# Patient Record
Sex: FEMALE | Race: ASIAN | NOT HISPANIC OR LATINO
[De-identification: names, ages, dates, MRNs, and addresses within clinical notes are randomized per-mention and may not be internally consistent; named-entity substitution may affect disease eponyms.]

---

## 2020-02-28 ENCOUNTER — RESULT REVIEW (OUTPATIENT)
Age: 25
End: 2020-02-28

## 2021-03-15 ENCOUNTER — RESULT REVIEW (OUTPATIENT)
Age: 26
End: 2021-03-15

## 2023-01-04 ENCOUNTER — APPOINTMENT (OUTPATIENT)
Dept: OPHTHALMOLOGY | Facility: CLINIC | Age: 28
End: 2023-01-04

## 2023-10-17 ENCOUNTER — NON-APPOINTMENT (OUTPATIENT)
Age: 28
End: 2023-10-17

## 2023-10-17 ENCOUNTER — APPOINTMENT (OUTPATIENT)
Dept: DERMATOLOGY | Facility: CLINIC | Age: 28
End: 2023-10-17
Payer: COMMERCIAL

## 2023-10-17 VITALS — BODY MASS INDEX: 21.6 KG/M2 | HEIGHT: 60 IN | WEIGHT: 110 LBS

## 2023-10-17 DIAGNOSIS — D22.9 MELANOCYTIC NEVI, UNSPECIFIED: ICD-10-CM

## 2023-10-17 PROCEDURE — 99203 OFFICE O/P NEW LOW 30 MIN: CPT

## 2024-05-03 ENCOUNTER — APPOINTMENT (OUTPATIENT)
Dept: DERMATOLOGY | Facility: CLINIC | Age: 29
End: 2024-05-03
Payer: COMMERCIAL

## 2024-05-03 DIAGNOSIS — L73.2 HIDRADENITIS SUPPURATIVA: ICD-10-CM

## 2024-05-03 DIAGNOSIS — L72.3 SEBACEOUS CYST: ICD-10-CM

## 2024-05-03 PROCEDURE — 99214 OFFICE O/P EST MOD 30 MIN: CPT | Mod: 25

## 2024-05-03 PROCEDURE — 11900 INJECT SKIN LESIONS </W 7: CPT

## 2024-05-03 RX ORDER — DOXYCYCLINE HYCLATE 100 MG/1
100 TABLET ORAL TWICE DAILY
Qty: 60 | Refills: 2 | Status: ACTIVE | COMMUNITY
Start: 2024-05-03 | End: 1900-01-01

## 2024-05-03 RX ORDER — CLINDAMYCIN PHOSPHATE 10 MG/ML
1 LOTION TOPICAL
Qty: 1 | Refills: 5 | Status: ACTIVE | COMMUNITY
Start: 2024-05-03 | End: 1900-01-01

## 2024-05-03 NOTE — HISTORY OF PRESENT ILLNESS
[FreeTextEntry1] : skin lesion [de-identified] : 29yo F presents for follow up, last seen 10/17/23 here for new skin concern on the right inguinal fold, present for several years but recently flaring took doxycycline before  denies involvement elsewhere, denies family hx of similar skin issue SH: current IM resident at [facility]

## 2024-05-03 NOTE — PHYSICAL EXAM
[Alert] : alert [Oriented x 3] : ~L oriented x 3 [Declined] : declined [FreeTextEntry3] : Focused exam: -hyperpigmented nodules and scarring on the bl inguinal folds, R > L

## 2024-05-03 NOTE — ASSESSMENT
[FreeTextEntry1] : #Hidradenitis suppurativa - Mark stage 1 -chronic, flaring -I have discussed the chronic nature and course of this condition -start hibiclens wash daily -start clindamycin 1% lotion bid to affected areas prn flare -start doxycycline 100mg BID x 1 month, SED- take with food/water, do not lie down for 30 mins after taking, use photoprotection, Cannot use in pregnancy or breast feeding.  #Inflamed EIC -ILK today as below  Procedure: Intralesional Kenalog (triamcinolone) Injection  Solution: 10 mg/mL Kenalog Total volume injected: 0.2 mL Location: R inguinal fold Lot: [id number] Exp date: 10/2025 Number of injection sites: 1   The risks/benefits/alternatives of intralesional steroid injections were reviewed with the patient which include but are not limited to pain, atrophy, and dyspigmentation The treatment location was cleansed with alcohol antiseptic and allowed to dry. The area was injected with intralesional kenalog as above. The patient tolerated the procedure well.     RTC PRN

## 2024-05-15 ENCOUNTER — APPOINTMENT (OUTPATIENT)
Dept: DERMATOLOGY | Facility: CLINIC | Age: 29
End: 2024-05-15